# Patient Record
Sex: MALE | Race: WHITE
[De-identification: names, ages, dates, MRNs, and addresses within clinical notes are randomized per-mention and may not be internally consistent; named-entity substitution may affect disease eponyms.]

---

## 2022-05-24 NOTE — NUR
BIBSELF C/O L SIDED CP RADIATING TO NECK XALL DAY. PATIENT ALERT AND ORIENTED 
X3. AMBULATORY WITH NON LABORED BREATHING IN BED 03 ON MONITOR AND POX AWATING 
MD PHIPPS.

## 2022-11-28 ENCOUNTER — HOSPITAL ENCOUNTER (EMERGENCY)
Dept: HOSPITAL 54 - ER | Age: 57
Discharge: HOME | End: 2022-11-28
Payer: COMMERCIAL

## 2022-11-28 VITALS — DIASTOLIC BLOOD PRESSURE: 84 MMHG | SYSTOLIC BLOOD PRESSURE: 130 MMHG

## 2022-11-28 VITALS — BODY MASS INDEX: 24.34 KG/M2 | WEIGHT: 170 LBS | HEIGHT: 70 IN

## 2022-11-28 DIAGNOSIS — I10: ICD-10-CM

## 2022-11-28 DIAGNOSIS — E11.9: ICD-10-CM
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
## 2022-11-28 DIAGNOSIS — S62.511A: Primary | ICD-10-CM
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
## 2024-06-19 NOTE — NUR
Patient does not wish to proceed with medical care recommended by Dr. Matthews. 
Patient given information related to possible complications, up to and 
including death, which could occur as a result of leaving the hospital at this 
time. Patient verbalizes understanding of risks involved due to leaving against 
medical advice. Patient has signed AMA form.